# Patient Record
Sex: MALE | URBAN - METROPOLITAN AREA
[De-identification: names, ages, dates, MRNs, and addresses within clinical notes are randomized per-mention and may not be internally consistent; named-entity substitution may affect disease eponyms.]

---

## 2017-01-16 ENCOUNTER — APPOINTMENT (RX ONLY)
Dept: URBAN - METROPOLITAN AREA CLINIC 128 | Facility: CLINIC | Age: 72
Setting detail: DERMATOLOGY
End: 2017-01-16

## 2017-01-16 DIAGNOSIS — I83.9 ASYMPTOMATIC VARICOSE VEINS OF LOWER EXTREMITIES: ICD-10-CM

## 2017-01-16 DIAGNOSIS — L73.8 OTHER SPECIFIED FOLLICULAR DISORDERS: ICD-10-CM

## 2017-01-16 DIAGNOSIS — L82.1 OTHER SEBORRHEIC KERATOSIS: ICD-10-CM

## 2017-01-16 DIAGNOSIS — L81.4 OTHER MELANIN HYPERPIGMENTATION: ICD-10-CM

## 2017-01-16 PROBLEM — I83.93 ASYMPTOMATIC VARICOSE VEINS OF BILATERAL LOWER EXTREMITIES: Status: ACTIVE | Noted: 2017-01-16

## 2017-01-16 PROCEDURE — ? COUNSELING

## 2017-01-16 PROCEDURE — 99214 OFFICE O/P EST MOD 30 MIN: CPT

## 2017-01-16 ASSESSMENT — LOCATION DETAILED DESCRIPTION DERM
LOCATION DETAILED: LEFT MEDIAL INFERIOR CHEST
LOCATION DETAILED: LEFT INFERIOR TEMPLE
LOCATION DETAILED: RIGHT PROXIMAL PRETIBIAL REGION
LOCATION DETAILED: RIGHT SUPERIOR MEDIAL UPPER BACK
LOCATION DETAILED: LEFT POSTERIOR SHOULDER
LOCATION DETAILED: RIGHT MEDIAL UPPER BACK
LOCATION DETAILED: LEFT INFERIOR LATERAL FOREHEAD
LOCATION DETAILED: LEFT PROXIMAL PRETIBIAL REGION
LOCATION DETAILED: LEFT CENTRAL MALAR CHEEK
LOCATION DETAILED: RIGHT MEDIAL SUPERIOR CHEST

## 2017-01-16 ASSESSMENT — LOCATION SIMPLE DESCRIPTION DERM
LOCATION SIMPLE: LEFT FOREHEAD
LOCATION SIMPLE: RIGHT UPPER BACK
LOCATION SIMPLE: LEFT SHOULDER
LOCATION SIMPLE: RIGHT PRETIBIAL REGION
LOCATION SIMPLE: LEFT CHEEK
LOCATION SIMPLE: CHEST
LOCATION SIMPLE: LEFT PRETIBIAL REGION
LOCATION SIMPLE: LEFT TEMPLE

## 2017-01-16 ASSESSMENT — LOCATION ZONE DERM
LOCATION ZONE: ARM
LOCATION ZONE: LEG
LOCATION ZONE: TRUNK
LOCATION ZONE: FACE

## 2017-06-06 ENCOUNTER — HOSPITAL ENCOUNTER (OUTPATIENT)
Dept: HOSPITAL 53 - M SFHCCLAY | Age: 72
End: 2017-06-06
Attending: FAMILY MEDICINE
Payer: MEDICARE

## 2017-06-06 DIAGNOSIS — R31.9: Primary | ICD-10-CM

## 2017-06-16 ENCOUNTER — HOSPITAL ENCOUNTER (OUTPATIENT)
Dept: HOSPITAL 53 - M SMT | Age: 72
End: 2017-06-16
Payer: MEDICARE

## 2017-06-16 DIAGNOSIS — Z12.5: ICD-10-CM

## 2017-06-16 DIAGNOSIS — R31.9: Primary | ICD-10-CM

## 2017-06-16 LAB
ANION GAP SERPL CALC-SCNC: 5 MEQ/L (ref 8–16)
BUN SERPL-MCNC: 17 MG/DL (ref 7–18)
CALCIUM SERPL-MCNC: 9.3 MG/DL (ref 8.8–10.2)
CHLORIDE SERPL-SCNC: 100 MEQ/L (ref 98–107)
CO2 SERPL-SCNC: 30 MEQ/L (ref 21–32)
CREAT SERPL-MCNC: 0.99 MG/DL (ref 0.7–1.3)
GFR SERPL CREATININE-BSD FRML MDRD: > 60 ML/MIN/{1.73_M2} (ref 42–?)
GLUCOSE SERPL-MCNC: 98 MG/DL (ref 83–110)
POTASSIUM SERPL-SCNC: 4.7 MEQ/L (ref 3.5–5.1)
SODIUM SERPL-SCNC: 135 MEQ/L (ref 136–145)

## 2017-06-16 PROCEDURE — 88108 CYTOPATH CONCENTRATE TECH: CPT

## 2017-06-16 PROCEDURE — 80048 BASIC METABOLIC PNL TOTAL CA: CPT

## 2017-06-16 PROCEDURE — 36415 COLL VENOUS BLD VENIPUNCTURE: CPT

## 2017-06-22 ENCOUNTER — HOSPITAL ENCOUNTER (OUTPATIENT)
Dept: HOSPITAL 53 - M RAD | Age: 72
End: 2017-06-22
Payer: MEDICARE

## 2017-06-22 DIAGNOSIS — R31.9: Primary | ICD-10-CM

## 2017-06-22 PROCEDURE — 74178 CT ABD&PLV WO CNTR FLWD CNTR: CPT

## 2017-07-18 ENCOUNTER — HOSPITAL ENCOUNTER (OUTPATIENT)
Dept: HOSPITAL 53 - M LABSMT | Age: 72
End: 2017-07-18
Attending: UROLOGY
Payer: MEDICARE

## 2017-07-18 DIAGNOSIS — Z01.818: Primary | ICD-10-CM

## 2017-07-18 DIAGNOSIS — N39.0: ICD-10-CM

## 2017-07-18 PROCEDURE — 93005 ELECTROCARDIOGRAM TRACING: CPT

## 2017-07-18 PROCEDURE — 87086 URINE CULTURE/COLONY COUNT: CPT

## 2017-07-24 ENCOUNTER — HOSPITAL ENCOUNTER (OUTPATIENT)
Dept: HOSPITAL 53 - M SDC | Age: 72
Discharge: HOME | End: 2017-07-24
Attending: UROLOGY
Payer: MEDICARE

## 2017-07-24 VITALS — WEIGHT: 172 LBS | HEIGHT: 69 IN | BODY MASS INDEX: 25.48 KG/M2

## 2017-07-24 VITALS — SYSTOLIC BLOOD PRESSURE: 130 MMHG | DIASTOLIC BLOOD PRESSURE: 72 MMHG

## 2017-07-24 DIAGNOSIS — C67.8: Primary | ICD-10-CM

## 2017-07-24 DIAGNOSIS — Z92.21: ICD-10-CM

## 2017-07-24 DIAGNOSIS — I10: ICD-10-CM

## 2017-07-24 DIAGNOSIS — E78.5: ICD-10-CM

## 2017-07-24 DIAGNOSIS — Z79.51: ICD-10-CM

## 2017-07-24 DIAGNOSIS — Z92.3: ICD-10-CM

## 2017-07-24 DIAGNOSIS — Z79.899: ICD-10-CM

## 2017-07-24 DIAGNOSIS — Z85.038: ICD-10-CM

## 2017-07-24 DIAGNOSIS — K21.9: ICD-10-CM

## 2017-07-24 PROCEDURE — 52235 CYSTOSCOPY AND TREATMENT: CPT

## 2017-07-24 PROCEDURE — 52341 CYSTO W/URETER STRICTURE TX: CPT

## 2017-07-24 PROCEDURE — 88307 TISSUE EXAM BY PATHOLOGIST: CPT

## 2017-07-24 PROCEDURE — 52332 CYSTOSCOPY AND TREATMENT: CPT

## 2017-07-24 PROCEDURE — 74420 UROGRAPHY RTRGR +-KUB: CPT

## 2018-01-18 ENCOUNTER — APPOINTMENT (RX ONLY)
Dept: URBAN - METROPOLITAN AREA CLINIC 128 | Facility: CLINIC | Age: 73
Setting detail: DERMATOLOGY
End: 2018-01-18

## 2018-01-18 DIAGNOSIS — L81.4 OTHER MELANIN HYPERPIGMENTATION: ICD-10-CM

## 2018-01-18 DIAGNOSIS — D18.0 HEMANGIOMA: ICD-10-CM

## 2018-01-18 DIAGNOSIS — D22 MELANOCYTIC NEVI: ICD-10-CM

## 2018-01-18 DIAGNOSIS — L72.8 OTHER FOLLICULAR CYSTS OF THE SKIN AND SUBCUTANEOUS TISSUE: ICD-10-CM

## 2018-01-18 DIAGNOSIS — L82.0 INFLAMED SEBORRHEIC KERATOSIS: ICD-10-CM

## 2018-01-18 DIAGNOSIS — L85.3 XEROSIS CUTIS: ICD-10-CM

## 2018-01-18 DIAGNOSIS — L82.1 OTHER SEBORRHEIC KERATOSIS: ICD-10-CM

## 2018-01-18 PROBLEM — D22.5 MELANOCYTIC NEVI OF TRUNK: Status: ACTIVE | Noted: 2018-01-18

## 2018-01-18 PROBLEM — D18.01 HEMANGIOMA OF SKIN AND SUBCUTANEOUS TISSUE: Status: ACTIVE | Noted: 2018-01-18

## 2018-01-18 PROCEDURE — ? COUNSELING

## 2018-01-18 PROCEDURE — 17110 DESTRUCTION B9 LES UP TO 14: CPT

## 2018-01-18 PROCEDURE — 99214 OFFICE O/P EST MOD 30 MIN: CPT | Mod: 25

## 2018-01-18 PROCEDURE — ? LIQUID NITROGEN

## 2018-01-18 ASSESSMENT — LOCATION DETAILED DESCRIPTION DERM
LOCATION DETAILED: RIGHT SUPERIOR MEDIAL UPPER BACK
LOCATION DETAILED: LEFT PROXIMAL PRETIBIAL REGION
LOCATION DETAILED: RIGHT INFERIOR MEDIAL UPPER BACK
LOCATION DETAILED: PERIUMBILICAL SKIN
LOCATION DETAILED: RIGHT SUPERIOR MEDIAL MIDBACK
LOCATION DETAILED: STERNUM
LOCATION DETAILED: EPIGASTRIC SKIN
LOCATION DETAILED: RIGHT PROXIMAL DORSAL FOREARM
LOCATION DETAILED: RIGHT INFERIOR POSTAURICULAR SKIN
LOCATION DETAILED: RIGHT DISTAL PRETIBIAL REGION
LOCATION DETAILED: RIGHT SUPERIOR MEDIAL LOWER BACK

## 2018-01-18 ASSESSMENT — LOCATION ZONE DERM
LOCATION ZONE: LEG
LOCATION ZONE: SCALP
LOCATION ZONE: TRUNK
LOCATION ZONE: ARM

## 2018-01-18 ASSESSMENT — LOCATION SIMPLE DESCRIPTION DERM
LOCATION SIMPLE: RIGHT LOWER BACK
LOCATION SIMPLE: RIGHT UPPER BACK
LOCATION SIMPLE: CHEST
LOCATION SIMPLE: RIGHT PRETIBIAL REGION
LOCATION SIMPLE: POSTERIOR SCALP
LOCATION SIMPLE: RIGHT FOREARM
LOCATION SIMPLE: ABDOMEN
LOCATION SIMPLE: LEFT PRETIBIAL REGION

## 2018-01-18 NOTE — PROCEDURE: MIPS QUALITY
Quality 111:Pneumonia Vaccination Status For Older Adults: Pneumococcal Vaccination Administered
Quality 110: Preventive Care And Screening: Influenza Immunization: Influenza Immunization previously received during influenza season
Quality 226: Preventive Care And Screening: Tobacco Use: Screening And Cessation Intervention: Patient screened for tobacco and never smoked
Detail Level: Detailed
Quality 131: Pain Assessment And Follow-Up: Pain assessment using a standardized tool is documented as negative, no follow-up plan required
Quality 130: Documentation Of Current Medications In The Medical Record: Current Medications Documented

## 2018-01-18 NOTE — PROCEDURE: LIQUID NITROGEN
Include Z78.9 (Other Specified Conditions Influencing Health Status) As An Associated Diagnosis?: No
Detail Level: Detailed
Medical Necessity Clause: This procedure was medically necessary because the lesions that were treated were:
Medical Necessity Information: It is in your best interest to select a reason for this procedure from the list below. All of these items fulfill various CMS LCD requirements except the new and changing color options.
Consent: The patient's consent was obtained including but not limited to risks of crusting, scabbing, blistering, scarring, darker or lighter pigmentary change, recurrence, incomplete removal and infection.
Post-Care Instructions: I reviewed with the patient in detail post-care instructions. Patient is to wear sunprotection, and avoid picking at any of the treated lesions. Pt may apply Vaseline to crusted or scabbing areas.

## 2018-04-24 ENCOUNTER — HOSPITAL ENCOUNTER (OUTPATIENT)
Dept: HOSPITAL 53 - M SMT | Age: 73
End: 2018-04-24
Attending: UROLOGY
Payer: MEDICARE

## 2018-04-24 DIAGNOSIS — C67.9: Primary | ICD-10-CM

## 2018-04-24 PROCEDURE — 88108 CYTOPATH CONCENTRATE TECH: CPT

## 2018-05-08 ENCOUNTER — HOSPITAL ENCOUNTER (OUTPATIENT)
Dept: HOSPITAL 53 - M SFHCCLAY | Age: 73
End: 2018-05-08
Attending: FAMILY MEDICINE
Payer: MEDICARE

## 2018-05-08 DIAGNOSIS — E78.5: ICD-10-CM

## 2018-05-08 DIAGNOSIS — I10: Primary | ICD-10-CM

## 2018-05-08 LAB
ALBUMIN/GLOBULIN RATIO: 1.18 (ref 1–1.93)
ALBUMIN: 3.9 GM/DL (ref 3.2–5.2)
ALKALINE PHOSPHATASE: 67 U/L (ref 45–117)
ALT SERPL W P-5'-P-CCNC: 27 U/L (ref 12–78)
ANION GAP: 6 MEQ/L (ref 8–16)
AST SERPL-CCNC: 20 U/L (ref 7–37)
BILIRUBIN,TOTAL: 0.6 MG/DL (ref 0.2–1)
BLOOD UREA NITROGEN: 15 MG/DL (ref 7–18)
CALCIUM LEVEL: 9.4 MG/DL (ref 8.8–10.2)
CARBON DIOXIDE LEVEL: 29 MEQ/L (ref 21–32)
CHLORIDE LEVEL: 103 MEQ/L (ref 98–107)
CHOLEST SERPL-MCNC: 175 MG/DL (ref ?–200)
CHOLESTEROL RISK RATIO: 3.24 (ref ?–5)
CREATININE FOR GFR: 1.13 MG/DL (ref 0.7–1.3)
GFR SERPL CREATININE-BSD FRML MDRD: > 60 ML/MIN/{1.73_M2} (ref 42–?)
GLUCOSE, FASTING: 105 MG/DL (ref 70–100)
HDLC SERPL-MCNC: 54 MG/DL (ref 40–?)
LDL CHOLESTEROL: 82.2 MG/DL (ref ?–100)
NONHDLC SERPL-MCNC: 121 MG/DL
POTASSIUM SERUM: 4.6 MEQ/L (ref 3.5–5.1)
SODIUM LEVEL: 138 MEQ/L (ref 136–145)
TOTAL PROTEIN: 7.2 GM/DL (ref 6.4–8.2)
TRIGLYCERIDES LEVEL: 194 MG/DL (ref ?–150)

## 2018-05-08 PROCEDURE — 80053 COMPREHEN METABOLIC PANEL: CPT

## 2018-09-12 ENCOUNTER — HOSPITAL ENCOUNTER (OUTPATIENT)
Dept: HOSPITAL 53 - M SMT | Age: 73
End: 2018-09-12
Attending: INTERNAL MEDICINE
Payer: MEDICARE

## 2018-09-12 DIAGNOSIS — J45.40: Primary | ICD-10-CM

## 2018-09-12 PROCEDURE — 71046 X-RAY EXAM CHEST 2 VIEWS: CPT

## 2018-10-16 ENCOUNTER — HOSPITAL ENCOUNTER (OUTPATIENT)
Dept: HOSPITAL 53 - M SMT | Age: 73
End: 2018-10-16
Attending: UROLOGY
Payer: MEDICARE

## 2018-10-16 DIAGNOSIS — C67.9: Primary | ICD-10-CM

## 2018-10-16 PROCEDURE — 88108 CYTOPATH CONCENTRATE TECH: CPT

## 2019-01-18 ENCOUNTER — APPOINTMENT (RX ONLY)
Dept: URBAN - METROPOLITAN AREA CLINIC 128 | Facility: CLINIC | Age: 74
Setting detail: DERMATOLOGY
End: 2019-01-18

## 2019-01-18 DIAGNOSIS — L81.4 OTHER MELANIN HYPERPIGMENTATION: ICD-10-CM

## 2019-01-18 DIAGNOSIS — L70.8 OTHER ACNE: ICD-10-CM

## 2019-01-18 DIAGNOSIS — I83.9 ASYMPTOMATIC VARICOSE VEINS OF LOWER EXTREMITIES: ICD-10-CM

## 2019-01-18 DIAGNOSIS — L91.8 OTHER HYPERTROPHIC DISORDERS OF THE SKIN: ICD-10-CM

## 2019-01-18 DIAGNOSIS — L73.8 OTHER SPECIFIED FOLLICULAR DISORDERS: ICD-10-CM

## 2019-01-18 DIAGNOSIS — L82.1 OTHER SEBORRHEIC KERATOSIS: ICD-10-CM

## 2019-01-18 DIAGNOSIS — Z71.89 OTHER SPECIFIED COUNSELING: ICD-10-CM

## 2019-01-18 DIAGNOSIS — L90.5 SCAR CONDITIONS AND FIBROSIS OF SKIN: ICD-10-CM

## 2019-01-18 DIAGNOSIS — D22 MELANOCYTIC NEVI: ICD-10-CM

## 2019-01-18 DIAGNOSIS — B35.1 TINEA UNGUIUM: ICD-10-CM

## 2019-01-18 DIAGNOSIS — D18.0 HEMANGIOMA: ICD-10-CM

## 2019-01-18 PROBLEM — I83.93 ASYMPTOMATIC VARICOSE VEINS OF BILATERAL LOWER EXTREMITIES: Status: ACTIVE | Noted: 2019-01-18

## 2019-01-18 PROBLEM — D22.5 MELANOCYTIC NEVI OF TRUNK: Status: ACTIVE | Noted: 2019-01-18

## 2019-01-18 PROBLEM — D18.01 HEMANGIOMA OF SKIN AND SUBCUTANEOUS TISSUE: Status: ACTIVE | Noted: 2019-01-18

## 2019-01-18 PROCEDURE — 99214 OFFICE O/P EST MOD 30 MIN: CPT

## 2019-01-18 PROCEDURE — ? COUNSELING

## 2019-01-18 ASSESSMENT — LOCATION SIMPLE DESCRIPTION DERM
LOCATION SIMPLE: LEFT GREAT TOE
LOCATION SIMPLE: LEFT PRETIBIAL REGION
LOCATION SIMPLE: UPPER BACK
LOCATION SIMPLE: LEFT FOREARM
LOCATION SIMPLE: CHEST
LOCATION SIMPLE: RIGHT FOREARM
LOCATION SIMPLE: RIGHT CHEEK
LOCATION SIMPLE: LEFT CHEEK
LOCATION SIMPLE: RIGHT KNEE
LOCATION SIMPLE: RIGHT LOWER BACK
LOCATION SIMPLE: RIGHT GREAT TOE
LOCATION SIMPLE: LEFT AXILLARY VAULT
LOCATION SIMPLE: LEFT UPPER BACK
LOCATION SIMPLE: RIGHT UPPER ARM
LOCATION SIMPLE: LEFT ANTERIOR NECK
LOCATION SIMPLE: LEFT KNEE
LOCATION SIMPLE: LEFT THIGH
LOCATION SIMPLE: RIGHT THIGH
LOCATION SIMPLE: ABDOMEN
LOCATION SIMPLE: RIGHT UPPER BACK

## 2019-01-18 ASSESSMENT — LOCATION DETAILED DESCRIPTION DERM
LOCATION DETAILED: LEFT AXILLARY VAULT
LOCATION DETAILED: RIGHT PROXIMAL POSTERIOR UPPER ARM
LOCATION DETAILED: RIGHT MID-UPPER BACK
LOCATION DETAILED: RIGHT ANTERIOR DISTAL THIGH
LOCATION DETAILED: EPIGASTRIC SKIN
LOCATION DETAILED: LEFT PROXIMAL DORSAL FOREARM
LOCATION DETAILED: LEFT KNEE
LOCATION DETAILED: RIGHT SUPERIOR LATERAL MIDBACK
LOCATION DETAILED: RIGHT SUPERIOR UPPER BACK
LOCATION DETAILED: RIGHT CENTRAL MALAR CHEEK
LOCATION DETAILED: PERIUMBILICAL SKIN
LOCATION DETAILED: LEFT DISTAL DORSAL FOREARM
LOCATION DETAILED: RIGHT KNEE
LOCATION DETAILED: LEFT PROXIMAL PRETIBIAL REGION
LOCATION DETAILED: RIGHT GREAT TOENAIL
LOCATION DETAILED: SUPERIOR THORACIC SPINE
LOCATION DETAILED: RIGHT PROXIMAL DORSAL FOREARM
LOCATION DETAILED: RIGHT INFERIOR MEDIAL UPPER BACK
LOCATION DETAILED: LEFT MEDIAL INFERIOR CHEST
LOCATION DETAILED: LEFT SUPERIOR CENTRAL MALAR CHEEK
LOCATION DETAILED: STERNUM
LOCATION DETAILED: LEFT CLAVICULAR NECK
LOCATION DETAILED: LEFT DORSAL GREAT TOE
LOCATION DETAILED: LEFT LATERAL UPPER BACK
LOCATION DETAILED: LEFT ANTERIOR DISTAL THIGH

## 2019-01-18 ASSESSMENT — LOCATION ZONE DERM
LOCATION ZONE: LEG
LOCATION ZONE: AXILLAE
LOCATION ZONE: ARM
LOCATION ZONE: FACE
LOCATION ZONE: TRUNK
LOCATION ZONE: TOE
LOCATION ZONE: NECK
LOCATION ZONE: TOENAIL

## 2019-01-18 NOTE — PROCEDURE: MIPS QUALITY
Detail Level: Generalized
Quality 111:Pneumonia Vaccination Status For Older Adults: Pneumococcal Vaccination Administered
Quality 110: Preventive Care And Screening: Influenza Immunization: Influenza Immunization previously received during influenza season

## 2019-03-01 ENCOUNTER — APPOINTMENT (RX ONLY)
Dept: URBAN - METROPOLITAN AREA CLINIC 128 | Facility: CLINIC | Age: 74
Setting detail: DERMATOLOGY
End: 2019-03-01

## 2019-03-01 DIAGNOSIS — D485 NEOPLASM OF UNCERTAIN BEHAVIOR OF SKIN: ICD-10-CM

## 2019-03-01 DIAGNOSIS — Z85.828 PERSONAL HISTORY OF OTHER MALIGNANT NEOPLASM OF SKIN: ICD-10-CM

## 2019-03-01 DIAGNOSIS — Z71.89 OTHER SPECIFIED COUNSELING: ICD-10-CM

## 2019-03-01 PROBLEM — D48.5 NEOPLASM OF UNCERTAIN BEHAVIOR OF SKIN: Status: ACTIVE | Noted: 2019-03-01

## 2019-03-01 PROCEDURE — ? OTHER

## 2019-03-01 PROCEDURE — ? BIOPSY BY SHAVE METHOD

## 2019-03-01 PROCEDURE — ? COUNSELING

## 2019-03-01 PROCEDURE — 99213 OFFICE O/P EST LOW 20 MIN: CPT | Mod: 25

## 2019-03-01 PROCEDURE — 11102 TANGNTL BX SKIN SINGLE LES: CPT

## 2019-03-01 ASSESSMENT — LOCATION ZONE DERM
LOCATION ZONE: ARM
LOCATION ZONE: FACE

## 2019-03-01 ASSESSMENT — LOCATION SIMPLE DESCRIPTION DERM
LOCATION SIMPLE: LEFT UPPER ARM
LOCATION SIMPLE: RIGHT CHEEK

## 2019-03-01 ASSESSMENT — LOCATION DETAILED DESCRIPTION DERM
LOCATION DETAILED: RIGHT SUPERIOR MEDIAL BUCCAL CHEEK
LOCATION DETAILED: LEFT ANTERIOR PROXIMAL UPPER ARM

## 2019-03-01 NOTE — PROCEDURE: BIOPSY BY SHAVE METHOD
Billing Type: [employer]
Silver Nitrate Text: The wound bed was treated with silver nitrate after the biopsy was performed.
Hemostasis: Aluminum Chloride
Biopsy Method: Personna blade
Bill [id number] For Specimen Handling/Conveyance To Laboratory?: no
Size Of Lesion In Cm: 0
Cryotherapy Text: The wound bed was treated with cryotherapy after the biopsy was performed.
Curettage Text: The wound bed was treated with curettage after the biopsy was performed.
Anesthesia Type: 1% lidocaine with epinephrine
Post-Care Instructions: I reviewed with the patient in detail post-care instructions. Patient is to keep the biopsy site dry overnight, and then apply bacitracin twice daily until healed. Patient may apply hydrogen peroxide soaks to remove any crusting.
Consent: Written consent was obtained and risks were reviewed including but not limited to scarring, infection, bleeding, scabbing, incomplete removal, nerve damage and allergy to anesthesia.
Anesthesia Volume In Cc (Will Not Render If 0): 0.5
Wound Care: Vaseline
Lab: [facility]
Depth Of Biopsy: dermis
Notification Instructions: Patient will be notified of biopsy results. However, patient instructed to call the office if not contacted within 2 weeks.
Biopsy Type: H and E
Dressing: pressure dressing with telfa
Was A Bandage Applied: Yes
Electrodesiccation And Curettage Text: The wound bed was treated with electrodesiccation and curettage after the biopsy was performed.
Electrodesiccation Text: The wound bed was treated with electrodesiccation after the biopsy was performed.
Detail Level: Detailed
Type Of Destruction Used: Curettage

## 2019-03-01 NOTE — PROCEDURE: OTHER
Note Text (......Xxx Chief Complaint.): This diagnosis correlates with the
Detail Level: Detailed
Other (Free Text): Wound care handout given

## 2019-03-21 ENCOUNTER — APPOINTMENT (RX ONLY)
Dept: URBAN - METROPOLITAN AREA CLINIC 128 | Facility: CLINIC | Age: 74
Setting detail: DERMATOLOGY
End: 2019-03-21

## 2019-03-21 DIAGNOSIS — Z71.89 OTHER SPECIFIED COUNSELING: ICD-10-CM

## 2019-03-21 DIAGNOSIS — Z85.828 PERSONAL HISTORY OF OTHER MALIGNANT NEOPLASM OF SKIN: ICD-10-CM

## 2019-03-21 PROBLEM — C44.629 SQUAMOUS CELL CARCINOMA OF SKIN OF LEFT UPPER LIMB, INCLUDING SHOULDER: Status: ACTIVE | Noted: 2019-03-21

## 2019-03-21 PROCEDURE — ? COUNSELING

## 2019-03-21 PROCEDURE — ? CURETTAGE AND DESTRUCTION

## 2019-03-21 PROCEDURE — ? OTHER

## 2019-03-21 PROCEDURE — ? DIAGNOSIS COMMENT

## 2019-03-21 PROCEDURE — 99213 OFFICE O/P EST LOW 20 MIN: CPT | Mod: 25

## 2019-03-21 PROCEDURE — 17261 DSTRJ MAL LES T/A/L .6-1.0CM: CPT

## 2019-03-21 ASSESSMENT — LOCATION SIMPLE DESCRIPTION DERM: LOCATION SIMPLE: LEFT UPPER ARM

## 2019-03-21 ASSESSMENT — LOCATION ZONE DERM: LOCATION ZONE: ARM

## 2019-03-21 ASSESSMENT — LOCATION DETAILED DESCRIPTION DERM: LOCATION DETAILED: LEFT ANTERIOR PROXIMAL UPPER ARM

## 2019-03-21 NOTE — PROCEDURE: OTHER
Detail Level: Zone
Other (Free Text): ED&C wound care handout given
Note Text (......Xxx Chief Complaint.): This diagnosis correlates with the

## 2019-03-21 NOTE — PROCEDURE: CURETTAGE AND DESTRUCTION
Anesthesia Volume In Cc: 1.5
Anesthesia Type: 1% lidocaine with epinephrine
Size Of Lesion After Curettage: 1
Consent was obtained from the patient. The risks, benefits and alternatives to therapy were discussed in detail. Specifically, the risks of infection, scarring, bleeding, prolonged wound healing, nerve injury, incomplete removal, allergy to anesthesia and recurrence were addressed. Alternatives to [facility], such as: surgical removal and XRT were also discussed. Prior to the procedure, the treatment site was clearly identified and confirmed by the patient. All components of Universal Protocol/PAUSE Rule completed.
Additional Information: (Optional): The wound was cleaned, and a pressure dressing was applied. The patient received detailed post-op instructions.
Biopsy Photograph Reviewed: Yes
Bill As A Line Item Or As Units: Line Item
Bill For Surgical Tray: no
Size Of Lesion In Cm: 0.4
Detail Level: Detailed
Number Of Curettages: 3
Cautery Type: electrodesiccation
Post-Care Instructions: I reviewed with the patient in detail post-care instructions. Patient is to keep the area dry for 48 hours, and not to engage in any swimming until the area is healed. Should the patient develop any fevers, chills, bleeding, severe pain patient will contact the office immediately.
What Was Performed First?: Curettage

## 2019-05-13 ENCOUNTER — HOSPITAL ENCOUNTER (OUTPATIENT)
Dept: HOSPITAL 53 - M SFHCCLAY | Age: 74
End: 2019-05-13
Attending: FAMILY MEDICINE
Payer: MEDICARE

## 2019-05-13 DIAGNOSIS — I10: Primary | ICD-10-CM

## 2019-05-13 LAB
ALBUMIN SERPL BCG-MCNC: 3.8 GM/DL (ref 3.2–5.2)
ALT SERPL W P-5'-P-CCNC: 40 U/L (ref 12–78)
BILIRUB SERPL-MCNC: 0.4 MG/DL (ref 0.2–1)
BUN SERPL-MCNC: 20 MG/DL (ref 7–18)
CALCIUM SERPL-MCNC: 9.3 MG/DL (ref 8.8–10.2)
CHLORIDE SERPL-SCNC: 104 MEQ/L (ref 98–107)
CHOLEST SERPL-MCNC: 191 MG/DL (ref ?–200)
CHOLEST/HDLC SERPL: 3.98 {RATIO} (ref ?–5)
CO2 SERPL-SCNC: 28 MEQ/L (ref 21–32)
CREAT SERPL-MCNC: 1.02 MG/DL (ref 0.7–1.3)
GFR SERPL CREATININE-BSD FRML MDRD: > 60 ML/MIN/{1.73_M2} (ref 42–?)
GLUCOSE SERPL-MCNC: 103 MG/DL (ref 70–100)
HDLC SERPL-MCNC: 48 MG/DL (ref 40–?)
LDLC SERPL CALC-MCNC: 92 MG/DL (ref ?–100)
NONHDLC SERPL-MCNC: 143 MG/DL
POTASSIUM SERPL-SCNC: 4.5 MEQ/L (ref 3.5–5.1)
PROT SERPL-MCNC: 6.8 GM/DL (ref 6.4–8.2)
SODIUM SERPL-SCNC: 138 MEQ/L (ref 136–145)
TRIGL SERPL-MCNC: 254 MG/DL (ref ?–150)

## 2019-05-13 PROCEDURE — 80061 LIPID PANEL: CPT

## 2019-05-13 PROCEDURE — 80053 COMPREHEN METABOLIC PANEL: CPT

## 2019-05-31 ENCOUNTER — HOSPITAL ENCOUNTER (OUTPATIENT)
Dept: HOSPITAL 53 - M SMT | Age: 74
End: 2019-05-31
Attending: UROLOGY
Payer: MEDICARE

## 2019-05-31 DIAGNOSIS — C67.9: Primary | ICD-10-CM

## 2020-01-23 ENCOUNTER — APPOINTMENT (RX ONLY)
Dept: URBAN - METROPOLITAN AREA CLINIC 128 | Facility: CLINIC | Age: 75
Setting detail: DERMATOLOGY
End: 2020-01-23

## 2020-01-23 DIAGNOSIS — D22 MELANOCYTIC NEVI: ICD-10-CM

## 2020-01-23 DIAGNOSIS — Z71.89 OTHER SPECIFIED COUNSELING: ICD-10-CM

## 2020-01-23 DIAGNOSIS — L90.5 SCAR CONDITIONS AND FIBROSIS OF SKIN: ICD-10-CM

## 2020-01-23 DIAGNOSIS — L82.1 OTHER SEBORRHEIC KERATOSIS: ICD-10-CM

## 2020-01-23 DIAGNOSIS — Z85.828 PERSONAL HISTORY OF OTHER MALIGNANT NEOPLASM OF SKIN: ICD-10-CM

## 2020-01-23 DIAGNOSIS — L56.8 OTHER SPECIFIED ACUTE SKIN CHANGES DUE TO ULTRAVIOLET RADIATION: ICD-10-CM

## 2020-01-23 DIAGNOSIS — I83.9 ASYMPTOMATIC VARICOSE VEINS OF LOWER EXTREMITIES: ICD-10-CM

## 2020-01-23 DIAGNOSIS — L82.0 INFLAMED SEBORRHEIC KERATOSIS: ICD-10-CM

## 2020-01-23 DIAGNOSIS — L72.8 OTHER FOLLICULAR CYSTS OF THE SKIN AND SUBCUTANEOUS TISSUE: ICD-10-CM

## 2020-01-23 DIAGNOSIS — D18.0 HEMANGIOMA: ICD-10-CM

## 2020-01-23 DIAGNOSIS — D69.2 OTHER NONTHROMBOCYTOPENIC PURPURA: ICD-10-CM

## 2020-01-23 DIAGNOSIS — L81.4 OTHER MELANIN HYPERPIGMENTATION: ICD-10-CM

## 2020-01-23 PROBLEM — D22.5 MELANOCYTIC NEVI OF TRUNK: Status: ACTIVE | Noted: 2020-01-23

## 2020-01-23 PROBLEM — D18.01 HEMANGIOMA OF SKIN AND SUBCUTANEOUS TISSUE: Status: ACTIVE | Noted: 2020-01-23

## 2020-01-23 PROBLEM — I83.93 ASYMPTOMATIC VARICOSE VEINS OF BILATERAL LOWER EXTREMITIES: Status: ACTIVE | Noted: 2020-01-23

## 2020-01-23 PROCEDURE — 17110 DESTRUCTION B9 LES UP TO 14: CPT

## 2020-01-23 PROCEDURE — ? OTHER

## 2020-01-23 PROCEDURE — 99214 OFFICE O/P EST MOD 30 MIN: CPT | Mod: 25

## 2020-01-23 PROCEDURE — ? LIQUID NITROGEN

## 2020-01-23 PROCEDURE — ? COUNSELING

## 2020-01-23 ASSESSMENT — LOCATION ZONE DERM
LOCATION ZONE: FACE
LOCATION ZONE: NECK
LOCATION ZONE: SCALP
LOCATION ZONE: HAND
LOCATION ZONE: LEG
LOCATION ZONE: ARM
LOCATION ZONE: TRUNK

## 2020-01-23 ASSESSMENT — LOCATION SIMPLE DESCRIPTION DERM
LOCATION SIMPLE: LEFT FOREHEAD
LOCATION SIMPLE: RIGHT ELBOW
LOCATION SIMPLE: CHEST
LOCATION SIMPLE: LEFT SCALP
LOCATION SIMPLE: RIGHT LOWER BACK
LOCATION SIMPLE: RIGHT UPPER BACK
LOCATION SIMPLE: LEFT ELBOW
LOCATION SIMPLE: LEFT UPPER ARM
LOCATION SIMPLE: LEFT FOREARM
LOCATION SIMPLE: LEFT SHOULDER
LOCATION SIMPLE: LEFT KNEE
LOCATION SIMPLE: ABDOMEN
LOCATION SIMPLE: LEFT THIGH
LOCATION SIMPLE: RIGHT FOREHEAD
LOCATION SIMPLE: LEFT PRETIBIAL REGION
LOCATION SIMPLE: RIGHT THIGH
LOCATION SIMPLE: LEFT HAND
LOCATION SIMPLE: LEFT UPPER BACK
LOCATION SIMPLE: RIGHT KNEE
LOCATION SIMPLE: RIGHT UPPER ARM
LOCATION SIMPLE: RIGHT SCALP
LOCATION SIMPLE: POSTERIOR NECK
LOCATION SIMPLE: RIGHT HAND
LOCATION SIMPLE: RIGHT FOREARM
LOCATION SIMPLE: SCALP
LOCATION SIMPLE: LEFT CLAVICULAR SKIN
LOCATION SIMPLE: RIGHT PRETIBIAL REGION
LOCATION SIMPLE: RIGHT SHOULDER

## 2020-01-23 ASSESSMENT — LOCATION DETAILED DESCRIPTION DERM
LOCATION DETAILED: RIGHT POSTERIOR SHOULDER
LOCATION DETAILED: LEFT VENTRAL PROXIMAL FOREARM
LOCATION DETAILED: RIGHT LATERAL FOREHEAD
LOCATION DETAILED: RIGHT ELBOW
LOCATION DETAILED: RIGHT DISTAL POSTERIOR UPPER ARM
LOCATION DETAILED: RIGHT ULNAR DORSAL HAND
LOCATION DETAILED: LEFT CENTRAL FRONTAL SCALP
LOCATION DETAILED: LEFT ANTERIOR PROXIMAL THIGH
LOCATION DETAILED: LEFT DISTAL POSTERIOR UPPER ARM
LOCATION DETAILED: RIGHT SUPERIOR POSTERIOR NECK
LOCATION DETAILED: LEFT ANTERIOR PROXIMAL UPPER ARM
LOCATION DETAILED: LEFT LATERAL INFERIOR CHEST
LOCATION DETAILED: LEFT PROXIMAL PRETIBIAL REGION
LOCATION DETAILED: LEFT VENTRAL DISTAL FOREARM
LOCATION DETAILED: RIGHT PROXIMAL DORSAL FOREARM
LOCATION DETAILED: RIGHT DISTAL DORSAL FOREARM
LOCATION DETAILED: LEFT MEDIAL UPPER BACK
LOCATION DETAILED: RIGHT MEDIAL UPPER BACK
LOCATION DETAILED: LEFT POSTERIOR SHOULDER
LOCATION DETAILED: RIGHT PROXIMAL POSTERIOR UPPER ARM
LOCATION DETAILED: RIGHT ANTERIOR PROXIMAL THIGH
LOCATION DETAILED: LEFT ANTERIOR DISTAL THIGH
LOCATION DETAILED: LEFT DISTAL PRETIBIAL REGION
LOCATION DETAILED: LEFT ANTERIOR SHOULDER
LOCATION DETAILED: RIGHT SUPERIOR MEDIAL UPPER BACK
LOCATION DETAILED: RIGHT PROXIMAL PRETIBIAL REGION
LOCATION DETAILED: RIGHT ANTERIOR DISTAL UPPER ARM
LOCATION DETAILED: LEFT DISTAL DORSAL FOREARM
LOCATION DETAILED: LEFT ELBOW
LOCATION DETAILED: LEFT KNEE
LOCATION DETAILED: RIGHT ANTERIOR DISTAL THIGH
LOCATION DETAILED: RIGHT SUPERIOR UPPER BACK
LOCATION DETAILED: RIGHT INFERIOR MEDIAL MIDBACK
LOCATION DETAILED: EPIGASTRIC SKIN
LOCATION DETAILED: RIGHT ANTERIOR PROXIMAL UPPER ARM
LOCATION DETAILED: RIGHT LATERAL ELBOW
LOCATION DETAILED: LEFT SUPERIOR UPPER BACK
LOCATION DETAILED: RIGHT DISTAL PRETIBIAL REGION
LOCATION DETAILED: RIGHT VENTRAL PROXIMAL FOREARM
LOCATION DETAILED: RIGHT KNEE
LOCATION DETAILED: RIGHT CENTRAL FRONTAL SCALP
LOCATION DETAILED: RIGHT SUPERIOR PARIETAL SCALP
LOCATION DETAILED: LEFT CLAVICULAR SKIN
LOCATION DETAILED: LEFT PROXIMAL RADIAL DORSAL FOREARM
LOCATION DETAILED: RIGHT ANTERIOR SHOULDER
LOCATION DETAILED: LEFT PROXIMAL POSTERIOR UPPER ARM
LOCATION DETAILED: PERIUMBILICAL SKIN
LOCATION DETAILED: LEFT ANTERIOR DISTAL UPPER ARM
LOCATION DETAILED: RIGHT SUPERIOR LATERAL MIDBACK
LOCATION DETAILED: XIPHOID
LOCATION DETAILED: STERNUM
LOCATION DETAILED: LEFT RADIAL DORSAL HAND
LOCATION DETAILED: LEFT MEDIAL INFERIOR CHEST
LOCATION DETAILED: LEFT LATERAL FOREHEAD

## 2020-01-23 NOTE — PROCEDURE: REASSURANCE
Hide Include Location In Plan Question?: No
Include Location In Plan?: Yes
Detail Level: Generalized
Detail Level: Simple
Detail Level: Zone

## 2020-01-23 NOTE — PROCEDURE: LIQUID NITROGEN
[facility]    [address] [place] [address]    Phone:  [phone]    Fax:  [phone]       Thank You for choosing us for your health care visit. We are glad to serve you and happy to provide you with this summary of your visit. Please help us to ensure we have accurate records. If you find anything that needs to be changed, please let our staff know as soon as possible.          Your Demographic Information     Patient Name Sex     Matthew Mcwilliams Male 2017       Ethnic Group Patient Race    Not of  or  Origin White      Your Visit Details     Date & Time Provider Department    1/3/2017 10:00 AM Pro Mayo MD [facility]      Your Upcoming Appointment*(Max 10)     2017 10:30 AM CDT   Well Child Exam with Pro Mayo MD   [facility] ([facility])    [address]  [place] [address]   [phone]              Your To Do List     Future Orders Please Complete On or Around Expires    HEMOGLOBIN      LEAD BLOOD/CAPILLARY      Follow-Up    Return in about 3 months (around 4/3/2017).      We Ordered or Performed the Following     HEMOGLOBIN     HEP A VACCINE PED/ADOL-2 DOSE     LEAD BLOOD/CAPILLARY     MMR VIRUS IMMUNIZATION, SUBCUT     VARICELLA LIVE SUBQ VACCINE CHICKEN POX       Conditions Discussed Today or Order-Related Diagnoses        Comments    Encounter for routine child health examination without abnormal findings    -  Primary     Screening for lead exposure         Screening, anemia, deficiency, iron         Need for vaccination           Your Vitals Were     Height Weight HC BMI Smoking Status       2' 8\" (0.813 m) (98 %, Z= 2.08)* 23 lb 13.5 oz (10.8 kg) (83 %, Z= 0.95)* 48 cm (18.9\") (92 %, Z= 1.40)* 16.37 kg/m2 Never Smoker     *Growth percentiles are based on WHO 
(Boys, 0-2 years) data.      Medications Prescribed or Re-Ordered Today     None      Allergies     No Known Allergies      Immunizations History as of 1/3/2017     Name Date    DTaP/Hep B/IPV 7/7/2016, 5/2/2016, 2/29/2016    HEPATITIS A CHILD 1/3/2017    HIB 7/7/2016, 5/2/2016, 2/29/2016    Hepatitis B Child 2017 11:35 PM    Influenza Quadrivalent Preservative Free 11/14/2016, 10/13/2016    MMR 1/3/2017    Pneumococcal Conjugate 13 Valent 7/7/2016, 5/2/2016, 2/29/2016    Rotavirus - Pentavalent 7/7/2016, 5/2/2016, 2/29/2016    Varicella 1/3/2017      Results of Testing Done Today               Patient Instructions          Your Child's Health  12-Month-Old Visit      Matthew Mcwilliams  January 3, 2017    Visit Vitals   • Ht 32\" (81.3 cm)   • Wt 10.8 kg   • HC 48 cm (18.9\")   • BMI 16.37 kg/m2           23.84 lbs    NUTRITION:  Growth slows down after one year of age.  Matthew's appetite may decrease and become more sporadic.  Serve Matthew food in small portions.  Let him be the  of how much to eat.  You should be the one to control what food is available and when.  Set a good example right from the start.    If you have not switched from formula yet, you may start cow's milk if Matthew is not sensitive to dairy products.  Use whole milk if he is still on baby food.  You may use lower fat milk if Matthew eats table foods, because those foods are higher in fat.  He needs some fats or oils in the diet in order to grow.  Even if there is heart disease in the family, Matthew should not be fat-restricted before the age of two years.  Your child needs to take between 16 and 24 ounces of milk per day.    If Matthew is still nursing, you may wish to supplement with beverages from a cup to ease the transition to independent drinking.    Excessive fruit juice may cause gas, crabbiness, and bowel movements that vary from diarrhea to constipation.  Whole fruit is better for Matthew.  Limit juice to 4 oz. per 
day.    A multivitamin is recommended for picky eaters and optional for good eaters.      Obesity and being overweight are serious problems in the [place].  You can help your child avoid these problems by following these guidelines:  1.  American Academy of Pediatrics recommends no screen time under 2 years.   2.  Don't encourage your children to eat if they tell you they are full.  Healthy children will not starve themselves.  3.  Don't reward your children for cleaning their plates.  If they always leave food, reduce the size of the portions and tell them to ask for more if they are still hungry.  4.  Don't allow children to dish out their own portions.  They will imitate adults or imitate what they have seen on TV; in both cases, the amount will be too large.  This results in increased calories and waste.  At least for children above 5 years of age and for adults, people eat more when given larger portions.    DEVELOPMENT/BEHAVIOR:  Children at this age generally will attempt to walk or improve their walking.  10 percent of normal children do not walk by 15 months, so there is a wide range.  They should be able to drink from a cup and point to things they want.    Babbling and imitating adult sounds are common at this age.    Matthew will begin to assert his independence at about the same time that he walks independently.  1.  Parents should discuss and agree on which behaviors are to be forbidden and on their approach to discipline.  Remember that things that may seem \"cute\" when children are small (like hitting) are not so cute when they are teens.   2.  Long lectures after misbehavior don't work; in fact, they actually reinforce the misbehavior by giving attention, the thing that your child most desires from you.  Help yourself and Matthew by being brief, by walling off hazards and breakables, and by distracting him from tantrums.  Nobody has the energy to teach all the rules at once.  Work on a few 
things at a time.  2.  Children are smart enough to learn that Hebert and Mommy may tolerate different things, but for dangerous behavior it is important to be consistent.  Matthew will learn faster.  3.  Tantrums will appear soon.  The most effective way of dealing with tantrums is to turn your back and walk away.  The loss of a parent's attention will bring the tantrum to an end.     ACCIDENT PREVENTION:  1.  Falls:  Remove furniture with sharp edges.  Coffee tables are especially hazardous.  Use simons on stairs and window latches on upper-story windows.  2.  Choking:  Avoid peanuts, gum, hard candy, raisins, and popcorn until age five.  Children can choke on foods that they can't chew well.  Don't let Matthew eat while running or playing.  Be sure foods such as grapes, peas, and corn are prepared in a size and consistency he can handle.  3.  Burns:  Begin to teach hot and cold.  Have a family fire safety plan, including regular smoke detector battery checks, working fire extinguishers, and two planned escape routes.  4.  Poisoning:  Use safety caps on medicines.  Household  and polishes must be kept out of reach. Store medicines and  out of sight.  Don't transfer medicines to non-child-proofed or unlabeled containers.  Dispose of unused medications. Be especially careful when visiting older persons or families without children in the house.  They may be in the habit of keeping their medicines out on tables.   Syrup of Ipecac is no longer recommended to be kept in the home.  Please dispose of any remaining supplies.   Call the Poison Center at 1-668.190.1150 for any known or suspected poisoning.    LEAD EXPOSURE:  If there is lead in the house, Matthew may be vulnerable.  Let us know if any of the following apply:  1.  Your home was built before 1978 and has peeling or chipping or chalking paint.  Homes built in older cities at the turn of the last century may have lead pipes.  Check to see if any 
of the above applies to Matthew's sitter's home, , , or any other house where he spends time.  2.  You may have other sources of lead in the home, including:  (a) herbal remedies like chace, margo, ghasard, kandu, azarcon, pay-loo-ah, or balagoli; (b) antique toys, especially those made of lead or pewter; (c) imported mini blinds; and (d) imported canned goods, especially acid foods like tomato and citrus. Do not cook or store foods in utensils made of crystal, pewter, or imported pottery.  3.  You have another child or housemate who is being followed or treated for an elevated lead level.  4.  Matthew lives with an adult whose job or hobby involves lead exposure (soldering, battery manufacture, recycling, casting, stained glass, etc.).  5.  You live near an active lead smelter, a battery recycling plant, or a plant that processes hot metal.    CAR SAFETY:  An approved rear facing car seat in the back seat of the car is required by [place] law until Matthew is two years old.  A rear-facing car seat in the back seat is the safest place for him.  This is especially true if your car is equipped with passenger-side air bags.  Forward-facing seats are not recommended until Matthew is over two years of age.    SMOKING:  Children exposed to tobacco smoke have more ear infections and pneumonia.  Cold symptoms last longer.  If you smoke, please quit.  If you cannot quit, smoke outside.  Do not smoke near Matthew, and do not let others smoke near him.    SHOES:  Sandys feet will develop fine whether they have shoes or not.  Children can run barefoot indoors or outdoors, as long as the surface is smooth and not hot.  Shoes provide safety, warmth, protection and, of course, decoration.  Choose shoes that have a roomy fit and flat, flexible soles with nonskid bottoms.    SLEEP:  The basic principles of good sleep for children are similar to those for adults:  1.  Have a consistent bedtime.  2.  Have a 
consistent time for the evening meal, preferably not immediately before bedtime.  3.  Avoid overly stimulating activity before bedtime.  For example, soon before going to bed Matthew should not engage in vigorous physical activity or watch scary or intense movies or TV shows, especially those in which children are frightened or hurt.  4.  Follow a reassuring routine at bedtime (stories, prayers, songs, etc.).  5.  Allow Matthew to have a transition object.  This might be a pillow, favorite blanket, or stuffed animal.  Avoid providing transition objects for the mouth.  Do not use objects on strings or objects that could be swallowed.  6.  Have a consistent waking time unless Matthew is ill and needs extra sleep.  Allowing children who have slept poorly at night to \"sleep in\" the next day delays meals and naps, and gets them further off schedule.  You would do better to allow a longer nap.    SUN EXPOSURE:  If you plan to have Matthew outside for more than 30 minutes, please apply sun screen.    TUBERCULOSIS:  There is such a low rate of tuberculosis in our area that frequent skin tests are no longer recommended.  However, certain situations do increase Matthew's risk, including contact with AIDS patients, nursing home residents, prisoners, immigrants, or homeless persons.  Please let us know if Matthew has contacts with such persons, or if he has contact with anyone known to have or suspected of having tuberculosis.    MEDICATION FOR FEVER OR PAIN:   Acetaminophen liquid (e.g., Tylenol or Tempra) may be given every four hours as needed for pain or fever.     INFANT/CHILDREN’S Tylenol/Acetaminophen  (160 MG/5 mL)    Child’s Weight: Dose:  18 - 23 pounds:   120 mg (3.75 mL (3/4 Teaspoon))  23 - 27 pounds:   160 mg (5.0 mL (1 Teaspoon))      CHILDREN'S Ibuprofen (100 MG/5mL) liquid (e.g., Advil or Motrin) may be given every six hours as needed for pain or fever.    Child’s Weight: Dose:  18 - 23 pounds:   1/2 - 1 
Teaspoon  23 - 27 pounds:   100 mg 1 Teaspoon    If Matthew is outside these weight ranges, call your pediatrician's office for advice.    Most Recent Immunizations   Administered Date(s) Administered   • DTaP/Hep B/IPV 07/07/2016   • HIB 07/07/2016   • Hepatitis B Child 2015   • Influenza Quadrivalent Preservative Free 11/14/2016   • Pneumococcal Conjugate 13 Valent 07/07/2016   • Rotavirus - Pentavalent 07/07/2016       If Matthew develops any of the following reactions within 72 hours after an immunization, notify your pediatrician by calling the pediatric phone nurse:  1.  A temperature of 105 degrees or above  2.  More than 3 hours of continuous crying  3.  A shrill, high-pitched cry  4.  A pale, limp spell  5.  A seizure or fainting spell.  In this case, you should call 911 or go immediately to the emergency room.    NEXT VISIT:  15 or 18 MONTHS OF AGE (Ask your pediatrician or nurse.)    Thank you for entrusting your care to [facility].    Fiber-Rich Foods    1. Dried beans, peas, and other legumes - This includes baked beans, kidney beans, split peas, dried anthony, garbanzos, saha beans and black beans.  2. Bran cereals - Topping this list are Bran Buds and All-Bran, but 100% Bran, Raisin Bran, Most and Cracklin' Bran are also excellent sources.  3. Fresh or frozen lima beans, both [place] and baby anthony.  4. Fresh or frozen green peas.  5. Dried fruit, topped by figs, apricots and dates.  6. Raspberries, blackberries and strawberries.  7. Sweet corn, whether on the cob or cut off in kernels.  8. Whole-wheat and other whole-grain cereal products. - Rye, oats, buckwheat and stone-ground cornmeal are all high in fiber.  Bread, pastas, pizzas, pancakes and muffins made with whole-grain flours.  9. Broccoli-very high in fiber!  10. Baked potato with the skin - (The skin when crisp is the best part for fiber).  Mashed and boiled potatoes are good, too-but not french fries, which contain a high 
percentage of fat.  11. Green snap beans, pole beans, and broad beans - (These are packaged frozen as Italian beans, in [place] they are known as haricot or Sudanese beans).  12. Plums, pears, and apples - The skin is edible, and are all high in pectin.  13. Raisins and prunes - Not as high on the list as other dried fruits (see #5) but very valuable.  14. Greens -Including spinach, beet greens, kale, collards, swiss chard and turnip greens.  15. Nuts -Especially almonds, Brazil nuts, peanuts, and walnuts (Consume these sparingly, because of their high fat content).  16. Cherries.  17. Bananas.  18. Carrots.  19. Coconut - (Dried or fresh-but both are high in fat content).  20. Cayuga sprouts.       
Include Z78.9 (Other Specified Conditions Influencing Health Status) As An Associated Diagnosis?: Yes
Detail Level: Zone
Medical Necessity Clause: This procedure was medically necessary because the lesions that were treated were:
Duration Of Freeze Thaw-Cycle (Seconds): 10
Add 52 Modifier (Optional): no
Medical Necessity Information: It is in your best interest to select a reason for this procedure from the list below. All of these items fulfill various CMS LCD requirements except the new and changing color options.
Number Of Freeze-Thaw Cycles: 2 freeze-thaw cycles
Post-Care Instructions: I reviewed with the patient in detail post-care instructions. Patient is to wear sunprotection, and avoid picking at any of the treated lesions. Pt may apply Vaseline to crusted or scabbing areas.
Consent: The patient's consent was obtained including but not limited to risks of crusting, scabbing, blistering, scarring, darker or lighter pigmentary change, recurrence, incomplete removal and infection.

## 2020-01-23 NOTE — PROCEDURE: MIPS QUALITY
Quality 130: Documentation Of Current Medications In The Medical Record: Current Medications Documented
Quality 111:Pneumonia Vaccination Status For Older Adults: Pneumococcal Vaccination Administered
Detail Level: Generalized

## 2020-06-24 ENCOUNTER — HOSPITAL ENCOUNTER (OUTPATIENT)
Dept: HOSPITAL 53 - M SMT | Age: 75
End: 2020-06-24
Attending: UROLOGY
Payer: MEDICARE

## 2020-06-24 DIAGNOSIS — Z85.51: Primary | ICD-10-CM

## 2021-01-28 ENCOUNTER — APPOINTMENT (RX ONLY)
Dept: URBAN - METROPOLITAN AREA CLINIC 128 | Facility: CLINIC | Age: 76
Setting detail: DERMATOLOGY
End: 2021-01-28

## 2021-01-28 DIAGNOSIS — D18.0 HEMANGIOMA: ICD-10-CM

## 2021-01-28 DIAGNOSIS — L57.0 ACTINIC KERATOSIS: ICD-10-CM

## 2021-01-28 DIAGNOSIS — L82.1 OTHER SEBORRHEIC KERATOSIS: ICD-10-CM

## 2021-01-28 DIAGNOSIS — I83.9 ASYMPTOMATIC VARICOSE VEINS OF LOWER EXTREMITIES: ICD-10-CM

## 2021-01-28 DIAGNOSIS — D69.2 OTHER NONTHROMBOCYTOPENIC PURPURA: ICD-10-CM

## 2021-01-28 DIAGNOSIS — L90.5 SCAR CONDITIONS AND FIBROSIS OF SKIN: ICD-10-CM

## 2021-01-28 DIAGNOSIS — L56.8 OTHER SPECIFIED ACUTE SKIN CHANGES DUE TO ULTRAVIOLET RADIATION: ICD-10-CM

## 2021-01-28 DIAGNOSIS — L81.4 OTHER MELANIN HYPERPIGMENTATION: ICD-10-CM

## 2021-01-28 DIAGNOSIS — Z71.89 OTHER SPECIFIED COUNSELING: ICD-10-CM

## 2021-01-28 DIAGNOSIS — L82.0 INFLAMED SEBORRHEIC KERATOSIS: ICD-10-CM

## 2021-01-28 DIAGNOSIS — Z85.828 PERSONAL HISTORY OF OTHER MALIGNANT NEOPLASM OF SKIN: ICD-10-CM

## 2021-01-28 DIAGNOSIS — D22 MELANOCYTIC NEVI: ICD-10-CM

## 2021-01-28 PROBLEM — I83.91 ASYMPTOMATIC VARICOSE VEINS OF RIGHT LOWER EXTREMITY: Status: ACTIVE | Noted: 2021-01-28

## 2021-01-28 PROBLEM — I83.93 ASYMPTOMATIC VARICOSE VEINS OF BILATERAL LOWER EXTREMITIES: Status: ACTIVE | Noted: 2021-01-28

## 2021-01-28 PROBLEM — D18.01 HEMANGIOMA OF SKIN AND SUBCUTANEOUS TISSUE: Status: ACTIVE | Noted: 2021-01-28

## 2021-01-28 PROBLEM — D22.5 MELANOCYTIC NEVI OF TRUNK: Status: ACTIVE | Noted: 2021-01-28

## 2021-01-28 PROCEDURE — ? MEDICARE ABN

## 2021-01-28 PROCEDURE — 17110 DESTRUCTION B9 LES UP TO 14: CPT

## 2021-01-28 PROCEDURE — ? OTHER

## 2021-01-28 PROCEDURE — 99213 OFFICE O/P EST LOW 20 MIN: CPT | Mod: 25

## 2021-01-28 PROCEDURE — ? LIQUID NITROGEN

## 2021-01-28 PROCEDURE — ? COUNSELING

## 2021-01-28 PROCEDURE — 17003 DESTRUCT PREMALG LES 2-14: CPT | Mod: 59

## 2021-01-28 PROCEDURE — 17000 DESTRUCT PREMALG LESION: CPT | Mod: 59

## 2021-01-28 ASSESSMENT — LOCATION ZONE DERM
LOCATION ZONE: FACE
LOCATION ZONE: NECK
LOCATION ZONE: LEG
LOCATION ZONE: TRUNK
LOCATION ZONE: HAND
LOCATION ZONE: ARM
LOCATION ZONE: SCALP

## 2021-01-28 ASSESSMENT — LOCATION DETAILED DESCRIPTION DERM
LOCATION DETAILED: LEFT SUPERIOR OCCIPITAL SCALP
LOCATION DETAILED: INFERIOR LUMBAR SPINE
LOCATION DETAILED: LEFT PROXIMAL DORSAL FOREARM
LOCATION DETAILED: SUPERIOR THORACIC SPINE
LOCATION DETAILED: LEFT PROXIMAL PRETIBIAL REGION
LOCATION DETAILED: LEFT ANTERIOR DISTAL UPPER ARM
LOCATION DETAILED: LEFT KNEE
LOCATION DETAILED: RIGHT ANTERIOR MEDIAL DISTAL THIGH
LOCATION DETAILED: LEFT CENTRAL ZYGOMA
LOCATION DETAILED: LEFT MEDIAL FOREHEAD
LOCATION DETAILED: RIGHT ANTERIOR DISTAL THIGH
LOCATION DETAILED: MID POSTERIOR NECK
LOCATION DETAILED: EPIGASTRIC SKIN
LOCATION DETAILED: PERIUMBILICAL SKIN
LOCATION DETAILED: LEFT SUPERIOR FOREHEAD
LOCATION DETAILED: LEFT ANTERIOR DISTAL THIGH
LOCATION DETAILED: LEFT ULNAR DORSAL HAND
LOCATION DETAILED: RIGHT PROXIMAL DORSAL FOREARM
LOCATION DETAILED: LEFT SUPERIOR MEDIAL FOREHEAD
LOCATION DETAILED: INFERIOR THORACIC SPINE
LOCATION DETAILED: MIDDLE STERNUM
LOCATION DETAILED: RIGHT SUPERIOR FOREHEAD
LOCATION DETAILED: SUPERIOR LUMBAR SPINE
LOCATION DETAILED: RIGHT SUPERIOR OCCIPITAL SCALP
LOCATION DETAILED: RIGHT INFERIOR ANTERIOR NECK
LOCATION DETAILED: RIGHT DISTAL DORSAL FOREARM
LOCATION DETAILED: SUBXIPHOID
LOCATION DETAILED: RIGHT SUPERIOR LATERAL MIDBACK

## 2021-01-28 ASSESSMENT — LOCATION SIMPLE DESCRIPTION DERM
LOCATION SIMPLE: POSTERIOR NECK
LOCATION SIMPLE: LEFT PRETIBIAL REGION
LOCATION SIMPLE: ABDOMEN
LOCATION SIMPLE: LOWER BACK
LOCATION SIMPLE: LEFT KNEE
LOCATION SIMPLE: LEFT HAND
LOCATION SIMPLE: RIGHT ANTERIOR NECK
LOCATION SIMPLE: LEFT FOREHEAD
LOCATION SIMPLE: RIGHT FOREARM
LOCATION SIMPLE: LEFT ZYGOMA
LOCATION SIMPLE: CHEST
LOCATION SIMPLE: LEFT FOREARM
LOCATION SIMPLE: RIGHT LOWER BACK
LOCATION SIMPLE: RIGHT THIGH
LOCATION SIMPLE: RIGHT FOREHEAD
LOCATION SIMPLE: UPPER BACK
LOCATION SIMPLE: POSTERIOR SCALP
LOCATION SIMPLE: LEFT UPPER ARM
LOCATION SIMPLE: LEFT THIGH

## 2021-01-28 NOTE — PROCEDURE: LIQUID NITROGEN
Consent: The patient's consent was obtained including but not limited to risks of crusting, scabbing, blistering, scarring, darker or lighter pigmentary change, recurrence, incomplete removal and infection.
Render Post-Care Instructions In Note?: yes
Detail Level: Zone
Number Of Freeze-Thaw Cycles: 2 freeze-thaw cycles
Duration Of Freeze Thaw-Cycle (Seconds): 3
Post-Care Instructions: I reviewed with the patient in detail post-care instructions. Patient is to wear sunprotection, and avoid picking at any of the treated lesions. Pt may apply Vaseline to crusted or scabbing areas.
Render Note In Bullet Format When Appropriate: No
Duration Of Freeze Thaw-Cycle (Seconds): 10
Medical Necessity Information: It is in your best interest to select a reason for this procedure from the list below. All of these items fulfill various CMS LCD requirements except the new and changing color options.
Medical Necessity Clause: This procedure was medically necessary because the lesions that were treated were: irritated

## 2021-01-28 NOTE — PROCEDURE: MEDICARE ABN
Detail Level: Detailed
Reason?: Additional Information
Procedure (Limit To 20 Characters): LN2
Reason?: non-covered service
Payment Option: Option 1: Bill Medicare, await for decision on payment.

## 2021-07-12 ENCOUNTER — HOSPITAL ENCOUNTER (OUTPATIENT)
Dept: HOSPITAL 53 - M SMT | Age: 76
End: 2021-07-12
Attending: UROLOGY
Payer: MEDICARE

## 2021-07-12 DIAGNOSIS — Z85.51: Primary | ICD-10-CM

## 2022-01-25 ENCOUNTER — APPOINTMENT (RX ONLY)
Dept: URBAN - METROPOLITAN AREA CLINIC 128 | Facility: CLINIC | Age: 77
Setting detail: DERMATOLOGY
End: 2022-01-25

## 2022-01-25 DIAGNOSIS — L72.0 EPIDERMAL CYST: ICD-10-CM

## 2022-01-25 DIAGNOSIS — D18.0 HEMANGIOMA: ICD-10-CM

## 2022-01-25 DIAGNOSIS — L56.8 OTHER SPECIFIED ACUTE SKIN CHANGES DUE TO ULTRAVIOLET RADIATION: ICD-10-CM

## 2022-01-25 DIAGNOSIS — L82.1 OTHER SEBORRHEIC KERATOSIS: ICD-10-CM

## 2022-01-25 DIAGNOSIS — L82.0 INFLAMED SEBORRHEIC KERATOSIS: ICD-10-CM

## 2022-01-25 DIAGNOSIS — Z85.828 PERSONAL HISTORY OF OTHER MALIGNANT NEOPLASM OF SKIN: ICD-10-CM

## 2022-01-25 DIAGNOSIS — L57.0 ACTINIC KERATOSIS: ICD-10-CM

## 2022-01-25 DIAGNOSIS — L81.4 OTHER MELANIN HYPERPIGMENTATION: ICD-10-CM

## 2022-01-25 DIAGNOSIS — D22 MELANOCYTIC NEVI: ICD-10-CM

## 2022-01-25 PROBLEM — D22.5 MELANOCYTIC NEVI OF TRUNK: Status: ACTIVE | Noted: 2022-01-25

## 2022-01-25 PROBLEM — D18.01 HEMANGIOMA OF SKIN AND SUBCUTANEOUS TISSUE: Status: ACTIVE | Noted: 2022-01-25

## 2022-01-25 PROCEDURE — 17003 DESTRUCT PREMALG LES 2-14: CPT | Mod: 59

## 2022-01-25 PROCEDURE — 17110 DESTRUCTION B9 LES UP TO 14: CPT

## 2022-01-25 PROCEDURE — ? DEFER

## 2022-01-25 PROCEDURE — ? COUNSELING

## 2022-01-25 PROCEDURE — 17000 DESTRUCT PREMALG LESION: CPT | Mod: 59

## 2022-01-25 PROCEDURE — ? LIQUID NITROGEN

## 2022-01-25 PROCEDURE — 99213 OFFICE O/P EST LOW 20 MIN: CPT | Mod: 25

## 2022-01-25 ASSESSMENT — LOCATION ZONE DERM
LOCATION ZONE: LEG
LOCATION ZONE: TRUNK

## 2022-01-25 ASSESSMENT — LOCATION DETAILED DESCRIPTION DERM
LOCATION DETAILED: LEFT PROXIMAL LATERAL CALF
LOCATION DETAILED: LEFT INFERIOR UPPER BACK
LOCATION DETAILED: RIGHT ANTERIOR PROXIMAL THIGH
LOCATION DETAILED: LEFT MEDIAL UPPER BACK
LOCATION DETAILED: RIGHT SUPERIOR MEDIAL UPPER BACK
LOCATION DETAILED: LEFT PROXIMAL CALF
LOCATION DETAILED: RIGHT MID-UPPER BACK
LOCATION DETAILED: RIGHT INFERIOR UPPER BACK

## 2022-01-25 ASSESSMENT — LOCATION SIMPLE DESCRIPTION DERM
LOCATION SIMPLE: LEFT UPPER BACK
LOCATION SIMPLE: LEFT CALF
LOCATION SIMPLE: RIGHT UPPER BACK
LOCATION SIMPLE: RIGHT THIGH

## 2022-01-25 NOTE — PROCEDURE: DEFER
Introduction Text (Please End With A Colon): The following procedure was deferred: bx
Detail Level: Detailed
Procedure To Be Performed At Next Visit: Excision

## 2022-01-25 NOTE — PROCEDURE: LIQUID NITROGEN
Application Tool (Optional): Cry-AC
Render Note In Bullet Format When Appropriate: No
Duration Of Freeze Thaw-Cycle (Seconds): 3
Post-Care Instructions: I reviewed with the patient in detail post-care instructions. Patient is to wear sunprotection, and avoid picking at any of the treated lesions. Pt may apply Vaseline to crusted or scabbing areas.
Number Of Freeze-Thaw Cycles: 1 freeze-thaw cycle
Show Applicator Variable?: Yes
Aperture Size (Optional): B
Consent: The patient's consent was obtained including but not limited to risks of crusting, scabbing, blistering, scarring, darker or lighter pigmentary change, recurrence, incomplete removal and infection.
Detail Level: Detailed
Medical Necessity Information: It is in your best interest to select a reason for this procedure from the list below. All of these items fulfill various CMS LCD requirements except the new and changing color options.
Medical Necessity Clause: This procedure was medically necessary because the lesions that were treated were:
Spray Paint Text: The liquid nitrogen was applied to the skin utilizing a spray paint frosting technique.

## 2022-12-15 ENCOUNTER — APPOINTMENT (RX ONLY)
Dept: URBAN - METROPOLITAN AREA CLINIC 119 | Facility: CLINIC | Age: 77
Setting detail: DERMATOLOGY
End: 2022-12-15

## 2022-12-15 DIAGNOSIS — L85.3 XEROSIS CUTIS: ICD-10-CM | Status: INADEQUATELY CONTROLLED

## 2022-12-15 DIAGNOSIS — L56.8 OTHER SPECIFIED ACUTE SKIN CHANGES DUE TO ULTRAVIOLET RADIATION: ICD-10-CM

## 2022-12-15 DIAGNOSIS — D18.0 HEMANGIOMA: ICD-10-CM

## 2022-12-15 DIAGNOSIS — B35.1 TINEA UNGUIUM: ICD-10-CM

## 2022-12-15 DIAGNOSIS — L81.4 OTHER MELANIN HYPERPIGMENTATION: ICD-10-CM

## 2022-12-15 DIAGNOSIS — L57.0 ACTINIC KERATOSIS: ICD-10-CM

## 2022-12-15 DIAGNOSIS — D22 MELANOCYTIC NEVI: ICD-10-CM

## 2022-12-15 DIAGNOSIS — L82.1 OTHER SEBORRHEIC KERATOSIS: ICD-10-CM

## 2022-12-15 DIAGNOSIS — Z85.828 PERSONAL HISTORY OF OTHER MALIGNANT NEOPLASM OF SKIN: ICD-10-CM

## 2022-12-15 DIAGNOSIS — D49.2 NEOPLASM OF UNSPECIFIED BEHAVIOR OF BONE, SOFT TISSUE, AND SKIN: ICD-10-CM

## 2022-12-15 PROBLEM — D18.01 HEMANGIOMA OF SKIN AND SUBCUTANEOUS TISSUE: Status: ACTIVE | Noted: 2022-12-15

## 2022-12-15 PROBLEM — D22.5 MELANOCYTIC NEVI OF TRUNK: Status: ACTIVE | Noted: 2022-12-15

## 2022-12-15 PROCEDURE — ? DIAGNOSIS COMMENT

## 2022-12-15 PROCEDURE — ? COUNSELING

## 2022-12-15 PROCEDURE — 11103 TANGNTL BX SKIN EA SEP/ADDL: CPT

## 2022-12-15 PROCEDURE — ? SUNSCREEN RECOMMENDATIONS

## 2022-12-15 PROCEDURE — ? BIOPSY BY SHAVE METHOD

## 2022-12-15 PROCEDURE — 99213 OFFICE O/P EST LOW 20 MIN: CPT | Mod: 25

## 2022-12-15 PROCEDURE — 17000 DESTRUCT PREMALG LESION: CPT | Mod: 59

## 2022-12-15 PROCEDURE — 17003 DESTRUCT PREMALG LES 2-14: CPT

## 2022-12-15 PROCEDURE — ? LIQUID NITROGEN

## 2022-12-15 PROCEDURE — 11102 TANGNTL BX SKIN SINGLE LES: CPT

## 2022-12-15 ASSESSMENT — LOCATION DETAILED DESCRIPTION DERM
LOCATION DETAILED: UPPER STERNUM
LOCATION DETAILED: LEFT INFERIOR MEDIAL UPPER BACK
LOCATION DETAILED: RIGHT PROXIMAL CALF
LOCATION DETAILED: LEFT SUPERIOR UPPER BACK
LOCATION DETAILED: INFERIOR LUMBAR SPINE
LOCATION DETAILED: RIGHT CENTRAL PARIETAL SCALP
LOCATION DETAILED: LOWER STERNUM
LOCATION DETAILED: EPIGASTRIC SKIN
LOCATION DETAILED: LEFT CENTRAL PARIETAL SCALP
LOCATION DETAILED: SUBXIPHOID
LOCATION DETAILED: LEFT LATERAL PROXIMAL PRETIBIAL REGION
LOCATION DETAILED: LEFT MEDIAL UPPER BACK
LOCATION DETAILED: INFERIOR THORACIC SPINE
LOCATION DETAILED: LEFT LATERAL DISTAL PRETIBIAL REGION
LOCATION DETAILED: RIGHT MEDIAL FRONTAL SCALP
LOCATION DETAILED: RIGHT KNEE
LOCATION DETAILED: RIGHT SUPERIOR FOREHEAD
LOCATION DETAILED: SUPERIOR LUMBAR SPINE
LOCATION DETAILED: LEFT PROXIMAL PRETIBIAL REGION
LOCATION DETAILED: LEFT POPLITEAL SKIN
LOCATION DETAILED: LEFT ANTERIOR DISTAL UPPER ARM
LOCATION DETAILED: LEFT DISTAL PLANTAR GREAT TOE

## 2022-12-15 ASSESSMENT — LOCATION SIMPLE DESCRIPTION DERM
LOCATION SIMPLE: CHEST
LOCATION SIMPLE: RIGHT SCALP
LOCATION SIMPLE: LEFT POPLITEAL SKIN
LOCATION SIMPLE: RIGHT FOREHEAD
LOCATION SIMPLE: LEFT PRETIBIAL REGION
LOCATION SIMPLE: LEFT GREAT TOE
LOCATION SIMPLE: RIGHT KNEE
LOCATION SIMPLE: ABDOMEN
LOCATION SIMPLE: UPPER BACK
LOCATION SIMPLE: LEFT UPPER ARM
LOCATION SIMPLE: RIGHT CALF
LOCATION SIMPLE: LOWER BACK
LOCATION SIMPLE: LEFT UPPER BACK
LOCATION SIMPLE: SCALP

## 2022-12-15 ASSESSMENT — LOCATION ZONE DERM
LOCATION ZONE: SCALP
LOCATION ZONE: LEG
LOCATION ZONE: FACE
LOCATION ZONE: ARM
LOCATION ZONE: TRUNK
LOCATION ZONE: TOE

## 2022-12-15 NOTE — PROCEDURE: REASSURANCE
Detail Level: Simple
Detail Level: Detailed
Skin Checks Recommendations: Refer back to Dr. Harpreet Batres for reevaluation
Detail Level: Zone
Hide Include Location In Plan Question?: No
Include Location In Plan?: Yes

## 2022-12-15 NOTE — PROCEDURE: BIOPSY BY SHAVE METHOD
Detail Level: Detailed
Depth Of Biopsy: dermis
Was A Bandage Applied: Yes
Size Of Lesion In Cm: 0.1
X Size Of Lesion In Cm: 0
Biopsy Type: H and E
Biopsy Method: Personna blade
Anesthesia Type: 1% lidocaine with epinephrine
Anesthesia Volume In Cc (Will Not Render If 0): 1.5
Hemostasis: Jaden's
Wound Care: Vaseline
Dressing: Band-Aid
Destruction After The Procedure: No
Type Of Destruction Used: Curettage
Curettage Text: The wound bed was treated with curettage after the biopsy was performed.
Cryotherapy Text: The wound bed was treated with cryotherapy after the biopsy was performed.
Electrodesiccation Text: The wound bed was treated with electrodesiccation after the biopsy was performed.
Electrodesiccation And Curettage Text: The wound bed was treated with electrodesiccation and curettage after the biopsy was performed.
Silver Nitrate Text: The wound bed was treated with silver nitrate after the biopsy was performed.
Lab: [facility]
Lab Facility: [id number] [facility]
Consent: Written consent was obtained and risks were reviewed including but not limited to scarring, infection, bleeding, scabbing, incomplete removal, nerve damage and allergy to anesthesia.
Post-Care Instructions: I reviewed with the patient in detail post-care instructions. Patient is to keep the biopsy site dry overnight, and then apply bacitracin twice daily until healed. Patient may apply hydrogen peroxide soaks to remove any crusting.
Notification Instructions: Patient will be notified of biopsy results. However, patient instructed to call the office if not contacted within 2 weeks.
Billing Type: [employer]
Information: Selecting Yes will display possible errors in your note based on the variables you have selected. This validation is only offered as a suggestion for you. PLEASE NOTE THAT THE VALIDATION TEXT WILL BE REMOVED WHEN YOU FINALIZE YOUR NOTE. IF YOU WANT TO FAX A PRELIMINARY NOTE YOU WILL NEED TO TOGGLE THIS TO 'NO' IF YOU DO NOT WANT IT IN YOUR FAXED NOTE.
Size Of Lesion In Cm: 0.3
Lab: [id number]
Lab Facility: [id number]
Billing Type: Third-Party Bill

## 2022-12-15 NOTE — PROCEDURE: LIQUID NITROGEN
Number Of Freeze-Thaw Cycles: 1 freeze-thaw cycle
Detail Level: Zone
Post-Care Instructions: I reviewed with the patient in detail post-care instructions. Patient is to wear sunprotection, and avoid picking at any of the treated lesions. Pt may apply Vaseline to crusted or scabbing areas.
Render Note In Bullet Format When Appropriate: No
Show Aperture Variable?: Yes
Duration Of Freeze Thaw-Cycle (Seconds): 3
Application Tool (Optional): Cry-AC
Consent: The patient's consent was obtained including but not limited to risks of crusting, scabbing, blistering, scarring, darker or lighter pigmentary change, recurrence, incomplete removal and infection.
Aperture Size (Optional): B

## 2022-12-15 NOTE — PROCEDURE: MIPS QUALITY
Quality 47: Advance Care Plan: Advance Care Planning discussed and documented in the medical record; patient did not wish or was not able to name a surrogate decision maker or provide an advance care plan.
Detail Level: Generalized
Quality 226: Preventive Care And Screening: Tobacco Use: Screening And Cessation Intervention: Patient screened for tobacco use and is an ex/non-smoker
Quality 130: Documentation Of Current Medications In The Medical Record: Current Medications Documented

## 2022-12-21 ENCOUNTER — APPOINTMENT (RX ONLY)
Dept: URBAN - METROPOLITAN AREA CLINIC 116 | Facility: CLINIC | Age: 77
Setting detail: DERMATOLOGY
End: 2022-12-21

## 2022-12-21 DIAGNOSIS — Z01.818 ENCOUNTER FOR OTHER PREPROCEDURAL EXAMINATION: ICD-10-CM

## 2022-12-21 PROCEDURE — ? COUNSELING

## 2023-02-14 ENCOUNTER — APPOINTMENT (RX ONLY)
Dept: URBAN - METROPOLITAN AREA CLINIC 119 | Facility: CLINIC | Age: 78
Setting detail: DERMATOLOGY
End: 2023-02-14

## 2023-02-14 PROBLEM — C44.519 BASAL CELL CARCINOMA OF SKIN OF OTHER PART OF TRUNK: Status: ACTIVE | Noted: 2023-02-14

## 2023-02-14 PROCEDURE — 17262 DSTRJ MAL LES T/A/L 1.1-2.0: CPT

## 2023-02-14 PROCEDURE — ? CURETTAGE AND DESTRUCTION

## 2023-02-14 PROCEDURE — ? COUNSELING

## 2023-02-14 NOTE — PROCEDURE: CURETTAGE AND DESTRUCTION
Detail Level: Simple
Biopsy Photograph Reviewed: Yes
Number Of Curettages: 4
Size Of Lesion After Curettage: 1.7
Add Intralesional Injection: No
Concentration (Mg/Ml Or Millions Of Plaque Forming Units/Cc): 0.01
Total Volume (Ccs): 1
Anesthesia Type: 1% lidocaine without epinephrine
Anesthesia Volume In Cc: 3
Cautery Type: electrodesiccation
What Was Performed First?: Curettage
Final Size Statement: The size of the lesion after treatment was
Additional Information: (Optional): The wound was cleaned, and a pressure dressing was applied. The patient received detailed post-op instructions.
Consent was obtained from the patient. The risks, benefits and alternatives to therapy were discussed in detail. Specifically, the risks of infection, scarring, bleeding, prolonged wound healing, nerve injury, incomplete removal, allergy to anesthesia and recurrence were addressed. Alternatives to [facility], such as: surgical removal and XRT were also discussed. Prior to the procedure, the treatment site was clearly identified and confirmed by the patient. All components of Universal Protocol/PAUSE Rule completed.
Post-Care Instructions: I reviewed with the patient in detail post-care instructions. Patient is to keep the area dry for 48 hours, and not to engage in any swimming until the area is healed. Should the patient develop any fevers, chills, bleeding, severe pain patient will contact the office immediately.
Bill As A Line Item Or As Units: Line Item

## 2023-05-30 ENCOUNTER — HOSPITAL ENCOUNTER (OUTPATIENT)
Dept: HOSPITAL 53 - M SFHCCLAY | Age: 78
End: 2023-05-30
Attending: FAMILY MEDICINE
Payer: MEDICARE

## 2023-05-30 DIAGNOSIS — E78.5: ICD-10-CM

## 2023-05-30 DIAGNOSIS — I10: Primary | ICD-10-CM

## 2023-05-30 LAB
ALBUMIN SERPL BCG-MCNC: 3.6 G/DL (ref 3.2–5.2)
ALP SERPL-CCNC: 50 U/L (ref 46–116)
ALT SERPL W P-5'-P-CCNC: 23 U/L (ref 7–40)
AST SERPL-CCNC: 21 U/L (ref ?–34)
BILIRUB SERPL-MCNC: 0.5 MG/DL (ref 0.3–1.2)
BUN SERPL-MCNC: 11 MG/DL (ref 9–23)
CALCIUM SERPL-MCNC: 8.9 MG/DL (ref 8.3–10.6)
CHLORIDE SERPL-SCNC: 104 MMOL/L (ref 98–107)
CHOLEST SERPL-MCNC: 135 MG/DL (ref ?–200)
CHOLEST/HDLC SERPL: 2.6 {RATIO} (ref ?–5)
CO2 SERPL-SCNC: 29 MMOL/L (ref 20–31)
CREAT SERPL-MCNC: 0.98 MG/DL (ref 0.7–1.3)
GFR SERPL CREATININE-BSD FRML MDRD: > 60 ML/MIN/{1.73_M2} (ref 42–?)
GLUCOSE SERPL-MCNC: 98 MG/DL (ref 74–106)
HCT VFR BLD AUTO: 38.6 % (ref 42–52)
HDLC SERPL-MCNC: 51.9 MG/DL (ref 40–?)
HGB BLD-MCNC: 13 G/DL (ref 13.5–17.5)
LDLC SERPL CALC-MCNC: 57.5 MG/DL (ref ?–100)
MCH RBC QN AUTO: 34.2 PG (ref 27–33)
MCHC RBC AUTO-ENTMCNC: 33.7 G/DL (ref 32–36.5)
MCV RBC AUTO: 101.6 FL (ref 80–96)
NONHDLC SERPL-MCNC: 83.1 MG/DL
PLATELET # BLD AUTO: 174 10^3/UL (ref 150–450)
POTASSIUM SERPL-SCNC: 4.7 MMOL/L (ref 3.5–5.1)
PROT SERPL-MCNC: 6.1 G/DL (ref 5.7–8.2)
RBC # BLD AUTO: 3.8 10^6/UL (ref 4.3–6.1)
SODIUM SERPL-SCNC: 140 MMOL/L (ref 136–145)
TRIGL SERPL-MCNC: 128 MG/DL (ref ?–150)
WBC # BLD AUTO: 4.7 10^3/UL (ref 4–10)

## 2023-07-10 ENCOUNTER — HOSPITAL ENCOUNTER (OUTPATIENT)
Dept: HOSPITAL 53 - M SMT | Age: 78
End: 2023-07-10
Attending: UROLOGY
Payer: MEDICARE

## 2023-07-10 DIAGNOSIS — C67.9: Primary | ICD-10-CM

## 2023-12-19 ENCOUNTER — APPOINTMENT (RX ONLY)
Dept: URBAN - METROPOLITAN AREA CLINIC 119 | Facility: CLINIC | Age: 78
Setting detail: DERMATOLOGY
End: 2023-12-19

## 2023-12-19 DIAGNOSIS — L91.0 HYPERTROPHIC SCAR: ICD-10-CM

## 2023-12-19 DIAGNOSIS — D18.0 HEMANGIOMA: ICD-10-CM

## 2023-12-19 DIAGNOSIS — L82.1 OTHER SEBORRHEIC KERATOSIS: ICD-10-CM

## 2023-12-19 DIAGNOSIS — Z85.828 PERSONAL HISTORY OF OTHER MALIGNANT NEOPLASM OF SKIN: ICD-10-CM

## 2023-12-19 DIAGNOSIS — L85.3 XEROSIS CUTIS: ICD-10-CM

## 2023-12-19 DIAGNOSIS — D22 MELANOCYTIC NEVI: ICD-10-CM

## 2023-12-19 DIAGNOSIS — L81.4 OTHER MELANIN HYPERPIGMENTATION: ICD-10-CM

## 2023-12-19 PROBLEM — D18.01 HEMANGIOMA OF SKIN AND SUBCUTANEOUS TISSUE: Status: ACTIVE | Noted: 2023-12-19

## 2023-12-19 PROBLEM — D22.5 MELANOCYTIC NEVI OF TRUNK: Status: ACTIVE | Noted: 2023-12-19

## 2023-12-19 PROCEDURE — ? OTHER

## 2023-12-19 PROCEDURE — ? DIAGNOSIS COMMENT

## 2023-12-19 PROCEDURE — ? COUNSELING

## 2023-12-19 PROCEDURE — 99213 OFFICE O/P EST LOW 20 MIN: CPT

## 2023-12-19 PROCEDURE — ? SUNSCREEN RECOMMENDATIONS

## 2023-12-19 ASSESSMENT — LOCATION SIMPLE DESCRIPTION DERM
LOCATION SIMPLE: ABDOMEN
LOCATION SIMPLE: LOWER BACK
LOCATION SIMPLE: CHEST
LOCATION SIMPLE: UPPER BACK
LOCATION SIMPLE: LEFT UPPER ARM
LOCATION SIMPLE: LEFT UPPER BACK

## 2023-12-19 ASSESSMENT — LOCATION DETAILED DESCRIPTION DERM
LOCATION DETAILED: EPIGASTRIC SKIN
LOCATION DETAILED: INFERIOR THORACIC SPINE
LOCATION DETAILED: LEFT SUPERIOR UPPER BACK
LOCATION DETAILED: SUPERIOR LUMBAR SPINE
LOCATION DETAILED: LEFT MEDIAL UPPER BACK
LOCATION DETAILED: LEFT ANTERIOR DISTAL UPPER ARM
LOCATION DETAILED: SUBXIPHOID
LOCATION DETAILED: UPPER STERNUM
LOCATION DETAILED: LOWER STERNUM
LOCATION DETAILED: INFERIOR LUMBAR SPINE

## 2023-12-19 ASSESSMENT — LOCATION ZONE DERM
LOCATION ZONE: TRUNK
LOCATION ZONE: ARM

## 2023-12-19 NOTE — PROCEDURE: SUNSCREEN RECOMMENDATIONS
Detail Level: Zone
General Sunscreen Counseling: I recommended a broad spectrum sunscreen with a SPF of 30 or higher.  I explained that SPF 30 sunscreens block approximately 97 percent of the sun's harmful rays.  Sunscreens should be applied at least 15 minutes prior to expected sun exposure and then every 2 hours after that as long as sun exposure continues. If swimming or exercising sunscreen should be reapplied every 45 minutes to an hour after getting wet or sweating.  One ounce, or the equivalent of a shot glass full of sunscreen, is adequate to protect the skin not covered by a bathing suit. I also recommended a lip balm with a sunscreen as well. Sun protective clothing can be used in lieu of sunscreen but must be worn the entire time you are exposed to the sun's rays.
Products Recommended: SPF 50+
General Sunscreen Counseling: I recommended a broad spectrum sunscreen with a SPF of 30 or higher.  I explained that SPF 30 sunscreens block approximately 97 percent of the sun's harmful rays.  Sunscreens should be applied at least 15 minutes prior to expected sun exposure and then every 2 hours after that as long as sun exposure continues. If swimming or exercising sunscreen should be reapplied every 45 minutes to an hour after getting wet or sweating.  One ounce, or the equivalent of a shot glass full of sunscreen, is adequate to protect the skin not covered by a bathing suit. I also recommended a lip balm with a sunscreen as well. Sun protective clothing can be used in lieu of sunscreen but must be worn the entire time you are exposed to the sun's rays. \\n\\n** recommended Neutrogena 50+ (anything active) or Elta MD **
Detail Level: Generalized
Products Recommended: Neutrogena SPF 70

## 2023-12-19 NOTE — PROCEDURE: OTHER
Other (Free Text): Neutragena/CeraVe/Vanicream/Amlactin OTC
Render Risk Assessment In Note?: no
Detail Level: Zone
Note Text (......Xxx Chief Complaint.): This diagnosis correlates with the

## 2023-12-19 NOTE — PROCEDURE: REASSURANCE
Hide Additional Notes?: No
Detail Level: Simple
Detail Level: Detailed
Skin Checks Recommendations: Refer back to Dr. Jimenez for reevaluation

## 2023-12-19 NOTE — PROCEDURE: DIAGNOSIS COMMENT
Detail Level: Detailed
Render Risk Assessment In Note?: no
Comment: Patient declines ILK at this time

## 2024-06-06 ENCOUNTER — HOSPITAL ENCOUNTER (OUTPATIENT)
Dept: HOSPITAL 53 - M SFHCCLAY | Age: 79
End: 2024-06-06
Attending: FAMILY MEDICINE
Payer: MEDICARE

## 2024-06-06 DIAGNOSIS — E78.5: ICD-10-CM

## 2024-06-06 DIAGNOSIS — D64.9: Primary | ICD-10-CM

## 2024-06-06 DIAGNOSIS — I10: ICD-10-CM

## 2024-06-06 LAB
ALBUMIN SERPL BCG-MCNC: 3.4 G/DL (ref 3.2–5.2)
ALP SERPL-CCNC: 53 U/L (ref 46–116)
ALT SERPL W P-5'-P-CCNC: 19 U/L (ref 7–40)
AST SERPL-CCNC: 12 U/L (ref ?–34)
BILIRUB SERPL-MCNC: 0.5 MG/DL (ref 0.3–1.2)
BUN SERPL-MCNC: 20 MG/DL (ref 9–23)
CALCIUM SERPL-MCNC: 9.3 MG/DL (ref 8.3–10.6)
CHLORIDE SERPL-SCNC: 105 MMOL/L (ref 98–107)
CHOLEST SERPL-MCNC: 146 MG/DL (ref ?–200)
CHOLEST/HDLC SERPL: 2.97 {RATIO} (ref ?–5)
CO2 SERPL-SCNC: 30 MMOL/L (ref 20–31)
CREAT SERPL-MCNC: 1.05 MG/DL (ref 0.7–1.3)
FOLATE SERPL-MCNC: > 24 NG/ML (ref 5.4–?)
GFR SERPL CREATININE-BSD FRML MDRD: > 60 ML/MIN/{1.73_M2} (ref 35–?)
GLUCOSE SERPL-MCNC: 99 MG/DL (ref 74–106)
HCT VFR BLD AUTO: 37.6 % (ref 42–52)
HDLC SERPL-MCNC: 49.1 MG/DL (ref 40–?)
HGB BLD-MCNC: 12.7 G/DL (ref 13.5–17.5)
IRON SERPL-MCNC: 74 UG/DL (ref 65–175)
LDLC SERPL CALC-MCNC: 59.5 MG/DL (ref ?–100)
MCH RBC QN AUTO: 34.3 PG (ref 27–33)
MCHC RBC AUTO-ENTMCNC: 33.8 G/DL (ref 32–36.5)
MCV RBC AUTO: 101.6 FL (ref 80–96)
NONHDLC SERPL-MCNC: 96.9 MG/DL
PLATELET # BLD AUTO: 167 10^3/UL (ref 150–450)
POTASSIUM SERPL-SCNC: 4.7 MMOL/L (ref 3.5–5.1)
PROT SERPL-MCNC: 6.1 G/DL (ref 5.7–8.2)
RBC # BLD AUTO: 3.7 10^6/UL (ref 4.3–6.1)
SODIUM SERPL-SCNC: 139 MMOL/L (ref 136–145)
TRIGL SERPL-MCNC: 187 MG/DL (ref ?–150)
VIT B12 SERPL-MCNC: 368 PG/ML (ref 211–911)
WBC # BLD AUTO: 5.9 10^3/UL (ref 4–10)

## 2024-07-08 ENCOUNTER — HOSPITAL ENCOUNTER (OUTPATIENT)
Dept: HOSPITAL 53 - M SMT | Age: 79
End: 2024-07-08
Attending: UROLOGY
Payer: MEDICARE

## 2024-07-08 DIAGNOSIS — C67.9: Primary | ICD-10-CM

## 2024-12-03 ENCOUNTER — APPOINTMENT (OUTPATIENT)
Dept: URBAN - METROPOLITAN AREA CLINIC 119 | Facility: CLINIC | Age: 79
Setting detail: DERMATOLOGY
End: 2024-12-03

## 2024-12-03 DIAGNOSIS — L85.3 XEROSIS CUTIS: ICD-10-CM

## 2024-12-03 DIAGNOSIS — D22 MELANOCYTIC NEVI: ICD-10-CM

## 2024-12-03 DIAGNOSIS — L81.4 OTHER MELANIN HYPERPIGMENTATION: ICD-10-CM

## 2024-12-03 DIAGNOSIS — L91.0 HYPERTROPHIC SCAR: ICD-10-CM

## 2024-12-03 DIAGNOSIS — L82.1 OTHER SEBORRHEIC KERATOSIS: ICD-10-CM

## 2024-12-03 DIAGNOSIS — D18.0 HEMANGIOMA: ICD-10-CM

## 2024-12-03 DIAGNOSIS — L72.8 OTHER FOLLICULAR CYSTS OF THE SKIN AND SUBCUTANEOUS TISSUE: ICD-10-CM

## 2024-12-03 DIAGNOSIS — Z85.828 PERSONAL HISTORY OF OTHER MALIGNANT NEOPLASM OF SKIN: ICD-10-CM

## 2024-12-03 PROBLEM — D18.01 HEMANGIOMA OF SKIN AND SUBCUTANEOUS TISSUE: Status: ACTIVE | Noted: 2024-12-03

## 2024-12-03 PROBLEM — D22.5 MELANOCYTIC NEVI OF TRUNK: Status: ACTIVE | Noted: 2024-12-03

## 2024-12-03 PROCEDURE — ? COUNSELING

## 2024-12-03 PROCEDURE — 99213 OFFICE O/P EST LOW 20 MIN: CPT

## 2024-12-03 PROCEDURE — ? DIAGNOSIS COMMENT

## 2024-12-03 PROCEDURE — ? SUNSCREEN RECOMMENDATIONS

## 2024-12-03 PROCEDURE — ? OTHER

## 2024-12-03 ASSESSMENT — LOCATION SIMPLE DESCRIPTION DERM
LOCATION SIMPLE: UPPER BACK
LOCATION SIMPLE: LEFT UPPER BACK
LOCATION SIMPLE: LOWER BACK
LOCATION SIMPLE: LEFT UPPER ARM
LOCATION SIMPLE: CHEST
LOCATION SIMPLE: ABDOMEN
LOCATION SIMPLE: RIGHT UPPER BACK

## 2024-12-03 ASSESSMENT — LOCATION DETAILED DESCRIPTION DERM
LOCATION DETAILED: LEFT SUPERIOR UPPER BACK
LOCATION DETAILED: LEFT MEDIAL UPPER BACK
LOCATION DETAILED: UPPER STERNUM
LOCATION DETAILED: INFERIOR LUMBAR SPINE
LOCATION DETAILED: EPIGASTRIC SKIN
LOCATION DETAILED: INFERIOR THORACIC SPINE
LOCATION DETAILED: SUPERIOR LUMBAR SPINE
LOCATION DETAILED: RIGHT INFERIOR UPPER BACK
LOCATION DETAILED: SUBXIPHOID
LOCATION DETAILED: LOWER STERNUM
LOCATION DETAILED: LEFT ANTERIOR DISTAL UPPER ARM

## 2024-12-03 ASSESSMENT — LOCATION ZONE DERM
LOCATION ZONE: TRUNK
LOCATION ZONE: ARM

## 2024-12-03 NOTE — PROCEDURE: REASSURANCE
Detail Level: Simple
Detail Level: Detailed
Skin Checks Recommendations: Refer back to Dr. Jimenez for reevaluation
Hide Additional Notes?: No

## 2025-05-13 ENCOUNTER — HOSPITAL ENCOUNTER (OUTPATIENT)
Dept: HOSPITAL 53 - M SFHCCLAY | Age: 80
End: 2025-05-13
Attending: FAMILY MEDICINE
Payer: MEDICARE

## 2025-05-13 DIAGNOSIS — I10: ICD-10-CM

## 2025-05-13 DIAGNOSIS — E78.5: ICD-10-CM

## 2025-05-13 DIAGNOSIS — D64.9: Primary | ICD-10-CM

## 2025-05-13 LAB
ALBUMIN SERPL BCG-MCNC: 3.8 G/DL (ref 3.2–5.2)
BILIRUB SERPL-MCNC: 0.6 MG/DL (ref 0.3–1.2)
CALCIUM SERPL-MCNC: 9.3 MG/DL (ref 8.3–10.6)
CHOLEST/HDLC SERPL: 3.07 {RATIO} (ref ?–5)
CREAT SERPL-MCNC: 0.9 MG/DL (ref 0.7–1.3)
GFR SERPL CREATININE-BSD FRML MDRD: 85.8 ML/MIN/{1.73_M2} (ref 35–?)
HCT VFR BLD AUTO: 39.8 % (ref 42–52)
HGB BLD-MCNC: 13.2 G/DL (ref 13.5–17.5)
LDLC SERPL CALC-MCNC: 69.8 MG/DL (ref ?–100)
MCH RBC QN AUTO: 33.2 PG (ref 27–33)
MCHC RBC AUTO-ENTMCNC: 33.2 G/DL (ref 32–36.5)
PLATELET # BLD AUTO: 185 10^3/UL (ref 150–450)
POTASSIUM SERPL-SCNC: 4.5 MMOL/L (ref 3.5–5.1)
PROT SERPL-MCNC: 6.3 G/DL (ref 5.7–8.2)
RBC # BLD AUTO: 3.98 10^6/UL (ref 4.3–6.1)
WBC # BLD AUTO: 5.4 10^3/UL (ref 4–10)

## 2025-07-07 ENCOUNTER — HOSPITAL ENCOUNTER (OUTPATIENT)
Dept: HOSPITAL 53 - M SMT | Age: 80
End: 2025-07-07
Attending: UROLOGY
Payer: MEDICARE

## 2025-07-07 DIAGNOSIS — C67.9: Primary | ICD-10-CM
